# Patient Record
Sex: FEMALE | Race: WHITE | HISPANIC OR LATINO | Employment: OTHER | URBAN - METROPOLITAN AREA
[De-identification: names, ages, dates, MRNs, and addresses within clinical notes are randomized per-mention and may not be internally consistent; named-entity substitution may affect disease eponyms.]

---

## 2022-06-04 ENCOUNTER — TELEPHONE ENCOUNTER (OUTPATIENT)
Dept: URBAN - METROPOLITAN AREA CLINIC 68 | Facility: CLINIC | Age: 77
End: 2022-06-04

## 2022-06-04 RX ORDER — CIPROFLOXACIN HYDROCHLORIDE 500 MG/1
CIPRO(    1 TABLET TWICE DAILY ) INACTIVE -HX ENTRY TABLET, FILM COATED ORAL
OUTPATIENT
Start: 2011-03-21

## 2022-06-04 RX ORDER — METRONIDAZOLE 500 MG/1
FLAGYL(    1 TABLET ORALLY THREE TIMES EACH DAY ) INACTIVE -HX ENTRY TABLET, FILM COATED ORAL
OUTPATIENT
Start: 2010-02-23

## 2022-06-04 RX ORDER — OMEPRAZOLE 20 MG/1
OMEPRAZOLE(    1 CAPSULE DAILY ) INACTIVE -HX ENTRY CAPSULE, DELAYED RELEASE ORAL
OUTPATIENT
Start: 2010-10-12

## 2022-06-04 RX ORDER — ESOMEPRAZOLE MAGNESIUM 40 MG
NEXIUM(    1 CAPSULE DAILY ) INACTIVE -HX ENTRY CAPSULE,DELAYED RELEASE (ENTERIC COATED) ORAL
OUTPATIENT
Start: 2004-08-04

## 2022-06-04 RX ORDER — CIPROFLOXACIN HYDROCHLORIDE 500 MG/1
CIPRO(    1 TABLET TWICE DAILY ) INACTIVE -HX ENTRY TABLET, FILM COATED ORAL
OUTPATIENT
Start: 2010-02-23

## 2022-06-04 RX ORDER — DOCUSATE SODIUM 100 MG/1
STOOL SOFTENER( 100MG ORAL  3 OR 4 PER DAY ) INACTIVE -HX ENTRY CAPSULE, LIQUID FILLED ORAL
OUTPATIENT
Start: 2016-01-12

## 2022-06-04 RX ORDER — ESTRADIOL 0.07 MG/D
CLIMARA(    1 PATCH WEEKLY ) INACTIVE -HX ENTRY PATCH TRANSDERMAL
OUTPATIENT
Start: 2004-08-04

## 2022-06-04 RX ORDER — ALENDRONATE SODIUM 70 MG
FOSAMAX(    1 TABLET WEEKLY ) INACTIVE -HX ENTRY TABLET ORAL
OUTPATIENT
Start: 2004-08-04

## 2022-06-04 RX ORDER — METRONIDAZOLE 500 MG/1
FLAGYL(    1 TABLET ORALLY THREE TIMES EACH DAY ) INACTIVE -HX ENTRY TABLET, FILM COATED ORAL
OUTPATIENT
Start: 2011-03-21

## 2022-06-05 ENCOUNTER — TELEPHONE ENCOUNTER (OUTPATIENT)
Dept: URBAN - METROPOLITAN AREA CLINIC 68 | Facility: CLINIC | Age: 77
End: 2022-06-05

## 2022-06-05 RX ORDER — OMEPRAZOLE 40 MG/1
OMEPRAZOLE( 40MG ORAL  TWICE DAILY ) ACTIVE -HX ENTRY CAPSULE, DELAYED RELEASE PELLETS ORAL TWICE DAILY
Status: ACTIVE | COMMUNITY
Start: 2016-01-12

## 2022-06-05 RX ORDER — POLYETHYLENE GLYCOL 3350 17 G/17G
POWDER, FOR SOLUTION ORAL DAILY
Qty: 1 | Refills: 1 | Status: ACTIVE | COMMUNITY
Start: 2013-08-08

## 2022-06-05 RX ORDER — SIMVASTATIN 5 MG/1
SIMVASTATIN( 5MG ORAL  DAILY ) ACTIVE -HX ENTRY TABLET, FILM COATED ORAL DAILY
Status: ACTIVE | COMMUNITY
Start: 2016-01-12

## 2022-06-25 ENCOUNTER — TELEPHONE ENCOUNTER (OUTPATIENT)
Age: 77
End: 2022-06-25

## 2022-06-25 RX ORDER — ALENDRONATE SODIUM 70 MG/1
FOSAMAX(    1 TABLET WEEKLY ) INACTIVE -HX ENTRY TABLET ORAL
OUTPATIENT
Start: 2004-08-04

## 2022-06-25 RX ORDER — ESOMEPRAZOLE MAGNESIUM 40 MG
NEXIUM(    1 CAPSULE DAILY ) INACTIVE -HX ENTRY CAPSULE,DELAYED RELEASE (ENTERIC COATED) ORAL
OUTPATIENT
Start: 2004-08-04

## 2022-06-25 RX ORDER — CIPROFLOXACIN HCL 500 MG
CIPRO(    1 TABLET TWICE DAILY ) INACTIVE -HX ENTRY TABLET ORAL
OUTPATIENT
Start: 2011-03-21

## 2022-06-25 RX ORDER — METHYLCELLULOSE 500 MG/1
CITRUCEL(    2-3 TIMES/WEEK ) INACTIVE -HX ENTRY TABLET ORAL
OUTPATIENT
Start: 2011-04-25

## 2022-06-25 RX ORDER — CIPROFLOXACIN HCL 500 MG
CIPRO(    1 TABLET TWICE DAILY ) INACTIVE -HX ENTRY TABLET ORAL
OUTPATIENT
Start: 2010-02-23

## 2022-06-25 RX ORDER — ERGOCALCIFEROL 1.25 MG/1
CAPSULE ORAL
OUTPATIENT
Start: 2010-02-23 | End: 2013-08-08

## 2022-06-25 RX ORDER — HYDROCORTISONE ACETATE 25 MG/1
ANUSOL HC SUPP.(    1 PER RECTUM QHS ) INACTIVE -HX ENTRY SUPPOSITORY RECTAL
OUTPATIENT
Start: 2004-09-09

## 2022-06-25 RX ORDER — POLYETHYLENE GLYCOL 3350, SODIUM SULFATE, SODIUM CHLORIDE, POTASSIUM CHLORIDE, ASCORBIC ACID, SODIUM ASCORBATE 7.5-2.691G
MOVIPREP(    AS DIRECTED ) INACTIVE -HX ENTRY KIT ORAL AS DIRECTED
OUTPATIENT
Start: 2010-10-12

## 2022-06-25 RX ORDER — OMEPRAZOLE 20 MG/1
OMEPRAZOLE(    1 CAPSULE DAILY ) INACTIVE -HX ENTRY CAPSULE, DELAYED RELEASE ORAL
OUTPATIENT
Start: 2010-10-12

## 2022-06-25 RX ORDER — DOCUSATE SODIUM 100 MG/1
STOOL SOFTENER( 100MG ORAL  3 OR 4 PER DAY ) INACTIVE -HX ENTRY CAPSULE, LIQUID FILLED ORAL
OUTPATIENT
Start: 2016-01-12

## 2022-06-25 RX ORDER — ESTRADIOL 0.07 MG/D
CLIMARA(    1 PATCH WEEKLY ) INACTIVE -HX ENTRY PATCH TRANSDERMAL
OUTPATIENT
Start: 2004-08-04

## 2022-06-26 ENCOUNTER — TELEPHONE ENCOUNTER (OUTPATIENT)
Age: 77
End: 2022-06-26

## 2022-06-26 RX ORDER — ELECTROLYTES/DEXTROSE
SOLUTION, ORAL ORAL
Status: ACTIVE | COMMUNITY
Start: 2011-04-25

## 2022-06-26 RX ORDER — SPIRONOLACT/HYDROCHLOROTHIAZID 25 MG-25MG
TABLET ORAL AS DIRECTED
Status: ACTIVE | COMMUNITY
Start: 2011-04-25

## 2022-06-26 RX ORDER — CETIRIZINE HYDROCHLORIDE 10 MG/1
ZYRTEC(    1 TABLET DAILY ) ACTIVE -HX ENTRY TABLET, FILM COATED ORAL
Status: ACTIVE | COMMUNITY
Start: 2010-10-12

## 2022-06-26 RX ORDER — SODIUM SULFATE, POTASSIUM SULFATE, MAGNESIUM SULFATE 17.5; 3.13; 1.6 G/ML; G/ML; G/ML
SOLUTION, CONCENTRATE ORAL AS DIRECTED
Qty: 1 | Refills: 0 | Status: ACTIVE | COMMUNITY
Start: 2016-01-12

## 2022-06-26 RX ORDER — SIMVASTATIN 5 MG/1
SIMVASTATIN( 5MG ORAL  DAILY ) ACTIVE -HX ENTRY TABLET, FILM COATED ORAL DAILY
Status: ACTIVE | COMMUNITY
Start: 2016-01-12

## 2022-06-26 RX ORDER — CALCIUM CARBONATE/VITAMIN D3 600 MG-10
TABLET ORAL AS DIRECTED
Status: ACTIVE | COMMUNITY
Start: 2011-04-25

## 2022-06-26 RX ORDER — METHYLCELLULOSE 500 MG/1
CITRUCEL(    TAKE 1 TABLET ORALLY TWICE DAILY ) ACTIVE -HX ENTRY TABLET ORAL
Status: ACTIVE | COMMUNITY
Start: 2011-04-25

## 2022-06-26 RX ORDER — DOCUSATE SODIUM 100 MG/1
STOOL SOFTENER(    TAKE 2 TABLETS ORALLY TWICE DAILY. ) ACTIVE -HX ENTRY CAPSULE, LIQUID FILLED ORAL
Status: ACTIVE | COMMUNITY
Start: 2011-04-25

## 2022-06-26 RX ORDER — POLYETHYLENE GLYCOL 3350 17 G/17G
POWDER, FOR SOLUTION ORAL DAILY
Qty: 1 | Refills: 1 | Status: ACTIVE | COMMUNITY
Start: 2013-08-08

## 2022-06-26 RX ORDER — OMEPRAZOLE 40 MG/1
OMEPRAZOLE( 40MG ORAL  TWICE DAILY ) ACTIVE -HX ENTRY CAPSULE, DELAYED RELEASE ORAL TWICE DAILY
Status: ACTIVE | COMMUNITY
Start: 2016-01-12

## 2023-04-14 ENCOUNTER — OFFICE VISIT (OUTPATIENT)
Dept: URBAN - METROPOLITAN AREA CLINIC 68 | Facility: CLINIC | Age: 78
End: 2023-04-14

## 2023-04-14 RX ORDER — CALCIUM CARBONATE/VITAMIN D3 600 MG-10
TABLET ORAL AS DIRECTED
Status: ACTIVE | COMMUNITY
Start: 2011-04-25

## 2023-04-14 RX ORDER — ELECTROLYTES/DEXTROSE
SOLUTION, ORAL ORAL
Status: ACTIVE | COMMUNITY
Start: 2011-04-25

## 2023-04-14 RX ORDER — SODIUM SULFATE, POTASSIUM SULFATE, MAGNESIUM SULFATE 17.5; 3.13; 1.6 G/ML; G/ML; G/ML
SOLUTION, CONCENTRATE ORAL AS DIRECTED
Qty: 1 | Refills: 0 | Status: DISCONTINUED | COMMUNITY
Start: 2016-01-12

## 2023-04-14 RX ORDER — BACLOFEN 10 MG/1
1 TABLET AS NEEDED TABLET ORAL TWICE A DAY
Status: ACTIVE | COMMUNITY

## 2023-04-14 RX ORDER — SOD SULF/POT CHLORIDE/MAG SULF 1.479 G
AS DIRECTED TABLET ORAL ONCE
Qty: 1 KIT | OUTPATIENT

## 2023-04-14 RX ORDER — POLYETHYLENE GLYCOL 3350 17 G/17G
POWDER, FOR SOLUTION ORAL DAILY
Qty: 1 | Refills: 1 | Status: ACTIVE | COMMUNITY
Start: 2013-08-08

## 2023-04-14 RX ORDER — SPIRONOLACT/HYDROCHLOROTHIAZID 25 MG-25MG
TABLET ORAL AS DIRECTED
Status: ACTIVE | COMMUNITY
Start: 2011-04-25

## 2023-04-14 RX ORDER — DOCUSATE SODIUM 100 MG/1
STOOL SOFTENER(    TAKE 2 TABLETS ORALLY TWICE DAILY. ) ACTIVE -HX ENTRY CAPSULE, LIQUID FILLED ORAL
Status: ACTIVE | COMMUNITY
Start: 2011-04-25

## 2023-04-14 RX ORDER — METHYLCELLULOSE 500 MG/1
CITRUCEL(    TAKE 1 TABLET ORALLY TWICE DAILY ) ACTIVE -HX ENTRY TABLET ORAL
Status: ACTIVE | COMMUNITY
Start: 2011-04-25

## 2023-04-14 RX ORDER — OMEPRAZOLE 40 MG/1
OMEPRAZOLE( 40MG ORAL  TWICE DAILY ) ACTIVE -HX ENTRY CAPSULE, DELAYED RELEASE ORAL TWICE DAILY
Status: ACTIVE | COMMUNITY
Start: 2016-01-12

## 2023-04-14 RX ORDER — SIMVASTATIN 5 MG/1
SIMVASTATIN( 5MG ORAL  DAILY ) ACTIVE -HX ENTRY TABLET, FILM COATED ORAL DAILY
Status: ACTIVE | COMMUNITY
Start: 2016-01-12

## 2023-04-14 RX ORDER — CETIRIZINE HYDROCHLORIDE 10 MG/1
ZYRTEC(    1 TABLET DAILY ) ACTIVE -HX ENTRY TABLET, FILM COATED ORAL
Status: ACTIVE | COMMUNITY
Start: 2010-10-12

## 2023-04-14 RX ORDER — BENZONATATE 200 MG/1
1 CAPSULE CAPSULE ORAL THREE TIMES A DAY
Status: ACTIVE | COMMUNITY

## 2023-04-14 NOTE — EXAM-MIGRATED EXAMINATIONS
General Examination: Extremities: -> normal extremity with no clubbing, cyanosis or edema   Neurologic: -> alert and oriented   Neck / thyroid: -> neck is supple, with full range of motion and no cervical lymphadenopathy   Skin: -> skin is warm and dry, with no rashes, good skin turgor and normal hair distribution   Heart: -> regular rate and rhythm without murmurs, gallops, clicks or rubs   Lungs: -> clear to auscultation bilaterally, with good air movement and no rales, rhonchi or wheezes   Chest: -> chest wall with no costochondral junction tenderness, no rib deformity and normal shape and expansion   Abdomen: -> soft with good bowel sounds, nontender, and no masses or hepatosplenomegaly , negative Arguello's sign   General appearance: -> alert, pleasant, well-nourished and in no acute distress   Head: -> normocephalic, atraumatic   Eyes: -> normal

## 2023-04-14 NOTE — HPI-MIGRATED HPI
Transition to Care : 77 y.o. WF with a history of chronic cough ongoing for years who is here for evaluation. She did have an EGD in 2013 with Dr. Goldberg which was normal. She has been taking Omeprazole 40mg/d for chronic reflux and feels that when she increase this to bid dosing her cough did improve. She also has chronic constipation and takes 5 stool softners daily. She was on Miralax in the past. She did have a colonoscopy in 2016 and has IH. She has history of colon polyps in the past. She had a negative Cologuard last year.

## 2023-06-06 ENCOUNTER — DASHBOARD ENCOUNTERS (OUTPATIENT)
Age: 78
End: 2023-06-06

## 2023-06-06 ENCOUNTER — CLAIMS CREATED FROM THE CLAIM WINDOW (OUTPATIENT)
Dept: URBAN - METROPOLITAN AREA SURGERY CENTER 12 | Facility: SURGERY CENTER | Age: 78
End: 2023-06-06
Payer: MEDICARE

## 2023-06-06 ENCOUNTER — WEB ENCOUNTER (OUTPATIENT)
Dept: URBAN - METROPOLITAN AREA SURGERY CENTER 12 | Facility: SURGERY CENTER | Age: 78
End: 2023-06-06

## 2023-06-06 ENCOUNTER — CLAIMS CREATED FROM THE CLAIM WINDOW (OUTPATIENT)
Dept: URBAN - METROPOLITAN AREA CLINIC 4 | Facility: CLINIC | Age: 78
End: 2023-06-06
Payer: MEDICARE

## 2023-06-06 DIAGNOSIS — K57.30 DIVERTICULOSIS OF LARGE INTESTINE WITHOUT PERFORATION OR ABSCESS WITHOUT BLEEDING: ICD-10-CM

## 2023-06-06 DIAGNOSIS — K63.5 POLYP OF DESCENDING COLON, UNSPECIFIED TYPE: ICD-10-CM

## 2023-06-06 DIAGNOSIS — K64.1 SECOND DEGREE HEMORRHOIDS: ICD-10-CM

## 2023-06-06 DIAGNOSIS — Z86.010 PERSONAL HISTORY OF COLONIC POLYPS: ICD-10-CM

## 2023-06-06 DIAGNOSIS — Z86.010 HISTORY OF COLON POLYPS: ICD-10-CM

## 2023-06-06 DIAGNOSIS — K31.7 POLYP OF STOMACH AND DUODENUM: ICD-10-CM

## 2023-06-06 DIAGNOSIS — K44.9 DIAPHRAGMATIC HERNIA WITHOUT OBSTRUCTION OR GANGRENE: ICD-10-CM

## 2023-06-06 DIAGNOSIS — K31.89 OTHER DISEASES OF STOMACH AND DUODENUM: ICD-10-CM

## 2023-06-06 DIAGNOSIS — K21.9 GERD: ICD-10-CM

## 2023-06-06 DIAGNOSIS — R05.9 COUGH: ICD-10-CM

## 2023-06-06 PROCEDURE — 88305 TISSUE EXAM BY PATHOLOGIST: CPT | Performed by: PATHOLOGY

## 2023-06-06 PROCEDURE — 45385 COLONOSCOPY W/LESION REMOVAL: CPT | Performed by: INTERNAL MEDICINE

## 2023-06-06 PROCEDURE — 45385 COLONOSCOPY W/LESION REMOVAL: CPT | Performed by: CLINIC/CENTER

## 2023-06-06 PROCEDURE — 43239 EGD BIOPSY SINGLE/MULTIPLE: CPT | Performed by: CLINIC/CENTER

## 2023-06-06 PROCEDURE — 88312 SPECIAL STAINS GROUP 1: CPT | Performed by: PATHOLOGY

## 2023-06-06 PROCEDURE — 99100 ANES PT EXTEME AGE<1 YR&>70: CPT | Performed by: NURSE ANESTHETIST, CERTIFIED REGISTERED

## 2023-06-06 PROCEDURE — 00813 ANES UPR LWR GI NDSC PX: CPT | Performed by: NURSE ANESTHETIST, CERTIFIED REGISTERED

## 2023-06-06 PROCEDURE — 43239 EGD BIOPSY SINGLE/MULTIPLE: CPT | Performed by: INTERNAL MEDICINE

## 2023-06-06 RX ORDER — DOCUSATE SODIUM 100 MG/1
STOOL SOFTENER(    TAKE 2 TABLETS ORALLY TWICE DAILY. ) ACTIVE -HX ENTRY CAPSULE, LIQUID FILLED ORAL
Status: ACTIVE | COMMUNITY
Start: 2011-04-25

## 2023-06-06 RX ORDER — SPIRONOLACT/HYDROCHLOROTHIAZID 25 MG-25MG
TABLET ORAL AS DIRECTED
Status: ACTIVE | COMMUNITY
Start: 2011-04-25

## 2023-06-06 RX ORDER — SOD SULF/POT CHLORIDE/MAG SULF 1.479 G
AS DIRECTED TABLET ORAL ONCE
Qty: 1 KIT | Status: ACTIVE | COMMUNITY

## 2023-06-06 RX ORDER — SIMVASTATIN 5 MG/1
SIMVASTATIN( 5MG ORAL  DAILY ) ACTIVE -HX ENTRY TABLET, FILM COATED ORAL DAILY
Status: ACTIVE | COMMUNITY
Start: 2016-01-12

## 2023-06-06 RX ORDER — POLYETHYLENE GLYCOL 3350 17 G/17G
POWDER, FOR SOLUTION ORAL DAILY
Qty: 1 | Refills: 1 | Status: ACTIVE | COMMUNITY
Start: 2013-08-08

## 2023-06-06 RX ORDER — BACLOFEN 10 MG/1
1 TABLET AS NEEDED TABLET ORAL TWICE A DAY
Status: ACTIVE | COMMUNITY

## 2023-06-06 RX ORDER — OMEPRAZOLE 40 MG/1
OMEPRAZOLE( 40MG ORAL  TWICE DAILY ) ACTIVE -HX ENTRY CAPSULE, DELAYED RELEASE ORAL TWICE DAILY
Status: ACTIVE | COMMUNITY
Start: 2016-01-12

## 2023-06-06 RX ORDER — CALCIUM CARBONATE/VITAMIN D3 600 MG-10
TABLET ORAL AS DIRECTED
Status: ACTIVE | COMMUNITY
Start: 2011-04-25

## 2023-06-06 RX ORDER — BENZONATATE 200 MG/1
1 CAPSULE CAPSULE ORAL THREE TIMES A DAY
Status: ACTIVE | COMMUNITY

## 2023-06-06 RX ORDER — METHYLCELLULOSE 500 MG/1
CITRUCEL(    TAKE 1 TABLET ORALLY TWICE DAILY ) ACTIVE -HX ENTRY TABLET ORAL
Status: ACTIVE | COMMUNITY
Start: 2011-04-25

## 2023-06-06 RX ORDER — CETIRIZINE HYDROCHLORIDE 10 MG/1
ZYRTEC(    1 TABLET DAILY ) ACTIVE -HX ENTRY TABLET, FILM COATED ORAL
Status: ACTIVE | COMMUNITY
Start: 2010-10-12

## 2023-06-06 RX ORDER — ELECTROLYTES/DEXTROSE
SOLUTION, ORAL ORAL
Status: ACTIVE | COMMUNITY
Start: 2011-04-25

## 2023-06-08 ENCOUNTER — TELEPHONE ENCOUNTER (OUTPATIENT)
Dept: URBAN - METROPOLITAN AREA CLINIC 68 | Facility: CLINIC | Age: 78
End: 2023-06-08

## 2023-06-13 PROBLEM — 724538004: Status: ACTIVE | Noted: 2023-06-13

## 2023-06-13 PROBLEM — 300855001: Status: ACTIVE | Noted: 2023-06-13
